# Patient Record
Sex: FEMALE | Race: WHITE | Employment: OTHER | URBAN - METROPOLITAN AREA
[De-identification: names, ages, dates, MRNs, and addresses within clinical notes are randomized per-mention and may not be internally consistent; named-entity substitution may affect disease eponyms.]

---

## 2019-10-03 ENCOUNTER — THERAPY VISIT (OUTPATIENT)
Dept: PHYSICAL THERAPY | Facility: CLINIC | Age: 74
End: 2019-10-03
Payer: COMMERCIAL

## 2019-10-03 DIAGNOSIS — M79.671 PAIN OF RIGHT HEEL: ICD-10-CM

## 2019-10-03 PROCEDURE — 97162 PT EVAL MOD COMPLEX 30 MIN: CPT | Mod: GP | Performed by: PHYSICAL THERAPIST

## 2019-10-03 PROCEDURE — 97110 THERAPEUTIC EXERCISES: CPT | Mod: GP | Performed by: PHYSICAL THERAPIST

## 2019-10-03 PROCEDURE — 97530 THERAPEUTIC ACTIVITIES: CPT | Mod: GP | Performed by: PHYSICAL THERAPIST

## 2019-10-03 NOTE — PROGRESS NOTES
[facility] Initial Evaluation -- Lower Extremity    Evaluation Date: October 3, 2019  Reva Lind is a 74 year old female with a Rt foot condition.   Referral: DPM  Work mechanical stresses: NA   Employment status: Retired  Leisure mechanical stresses: No formal exercise  Functional disability score: NA  VAS score (0-10): 4/10  Patient goals/expectations:  Walk with less pain.    HISTORY:    Present symptoms: Rt posterior heel  Pain quality (sharp/shooting/stabbing/aching/burning/cramping):  Dull ache    Present since (onset date): 1 year MD referral date 7.10.19   Symptoms (improving/unchaning/worsening):  unchanging.      Symptoms commenced as a result of: No apparent reason   Condition occurred in the following environment: Unknown     Symptoms at onset: Rt heel pain  Paresthesia (yes/no):  Yes. N/T feeling in heel  Spinal history: No    Cough/Sneeze (pos/neg):  Neg    Constant symptoms: None  Intermittent symptoms: As above    Symptoms are worse with the following: Sometimes Sitting, Always Rising, Always First few steps, Always Standing, Always Walking, Always Stairs, Always On the move and Time of day - No effect   Symptoms are better with the following: Other - ibuprofen    Continued use makes the pain (better/worse/no effect): worse    Disturbed night (yes/no): No      Pain at rest (yes/no):  Yes  Site (back/hip/knee/ankle/foot):  Rt heel    Other questions (swelling/clicking/locking/giving way/falling):  No     Previous episodes: No  Previous treatments: None    Specific Questions:  General health (excellent/good/fair/poor):  Good  Pertinent medical history includes: High blood pressure, History of fractures and Osteoarthritis  Medications (nil/NSAIDS/analg/steroids/anticoag/other):  NSAIDS and Other - Anti-depressants and High blood pressure  Medical allergies:  None  Imaging (none/Xray/MRI/other):  None  Recent or major surgery (yes/no):  Rt wrist  Night pain (yes/no):  No  Accidents  (yes/no):  No  Unexplained weight loss (yes/no):  No  Barriers at home: None  Other red flags: None    Sites for physical examination (back/hip/knee/ankle/foot/other): ankle, low back    EXAMINATION    Posture:  Sitting (good/fair/poor): Poor    Correction of Posture (better/worse/no effect/NA): Better  Standing (good/fair/poor):   Other observations:      Neurological: (NA/motor/sensory/reflexes/dural): See strength testing below    Baselines (pain or functional activity): Painful walking, going up/down stairs, sitting    Extremities (Hip / Knee / Ankle / Foot): Ankle    Movement Loss Dmitriy Mod Min Nil Pain   Dorsiflexion   X  Rt - 5 deg lacking from 0/Inc N/T heel  Lt - 0 deg   Plantarflexion    X 60 deg (B)   Inversion        Eversion          Passive Movement (+/- over pressure)/(PDM/ERP):  NT  Resisted Test Response (pain): Rt DF 4/5; Rt PF 4/5  Other Tests: NT    Spine:  Movement loss: Flex WNL; Ext Mod-Dmitriy loss/ERP and N/T Rt heel; SG Mod loss (B)  Effect of repeated movements: Prone lying in ext (forearms on 2 pillows) - NE, NE, Dec heel pain walking, Inc DF and PF strength, Inc DF ROM  Effect of static positioning: NT  Spine testing (not relevant/relevant/secondary problem): Relevant    Baseline Symptoms: NA  Repeated Tests Symptom Response Mechanical Response   Active/Passive movement, resisted test, functional test During -  Produce, Abolish, Increase, Decrease, NE After -  Better, Worse, NB, NW, NE Effect -   ? or ? ROM, strength or key functional test No   Effect   NT due to clearing lumbar spine       Effect of static positioning       NT         Provisional Classification (Extremity/Spine):  Spine - Derangement - Asymmetrical, unilateral, symptoms below knee      Princicple of Management:   Education:  Posture, specificity of exercise and rationale with repeated movements, spine as source of extremity sx's    Equipment provided:  None  Exercise and dosage:  LAWRENCE x 3-5 min, 4-5 x  daily    ASSESSMENT/PLAN:    Patient is a 74 year old female with right side ankle complaints.    Patient has the following significant findings with corresponding treatment plan.                Diagnosis 1:  Rt heel pain with lumbar component    Pain -  self management, education, directional preference exercise and home program  Decreased ROM/flexibility - manual therapy, therapeutic exercise and home program  Decreased joint mobility - manual therapy, therapeutic exercise and home program  Decreased strength - therapeutic exercise, therapeutic activities and home program  Impaired gait - gait training and home program  Impaired muscle performance - neuro re-education and home program  Decreased function - therapeutic activities and home program  Impaired posture - neuro re-education and home program    Therapy Evaluation Codes:   1) History comprised of:   Personal factors that impact the plan of care:      None.    Comorbidity factors that impact the plan of care are:      High blood pressure and Osteoarthritis.     Medications impacting care: Anti-depressant, Anti-inflammatory and High blood pressure.  2) Examination of Body Systems comprised of:   Body structures and functions that impact the plan of care:      Ankle and Lumbar spine.   Activity limitations that impact the plan of care are:      Sitting, Stairs, Standing and Walking.  3) Clinical presentation characteristics are:   Evolving/Changing.  4) Decision-Making    Moderate complexity using standardized patient assessment instrument and/or measureable assessment of functional outcome.  Cumulative Therapy Evaluation is: Moderate complexity.    Previous and current functional limitations:  (See Goal Flow Sheet for this information)    Short term and Long term goals: (See Goal Flow Sheet for this information)     Communication ability:  Patient appears to be able to clearly communicate and understand verbal and written communication and follow directions  correctly.  Treatment Explanation - The following has been discussed with the patient:   RX ordered/plan of care  Anticipated outcomes  Possible risks and side effects  This patient would benefit from PT intervention to resume normal activities.   Rehab potential is good.    Frequency:  1 X week, once daily  Duration:  for 6 weeks  Discharge Plan:  Achieve all LTG.  Independent in home treatment program.  Reach maximal therapeutic benefit.    Please refer to the daily flowsheet for treatment today, total treatment time and time spent performing 1:1 timed codes.

## 2019-10-03 NOTE — LETTER
[facility] ATHLETIC [facility] PHYSICAL THERAPY  [address]  [facility] [address]  [phone]    2019    Re: Reva Lind   :   1945  MRN:  [id number]   REFERRING PHYSICIAN:   Ricardo Bae    [facility]THLETIC [facility] PHYSICAL THERAPY    Date of Initial Evaluation:  10/3/2019  Visits:    1  Reason for Referral:  Pain of right heel    [facility] Initial Evaluation -- Lower Extremity    Evaluation Date: October 3, 2019  Reva Lind is a 74 year old female with a Rt foot condition.   Referral: DPM  Work mechanical stresses: NA   Employment status: Retired  Leisure mechanical stresses: No formal exercise  Functional disability score: NA  VAS score (0-10): 4/10  Patient goals/expectations:  Walk with less pain.    HISTORY:  Present symptoms: Rt posterior heel  Pain quality (sharp/shooting/stabbing/aching/burning/cramping):  Dull ache  Present since (onset date): 1 year MD referral date 7.10.19   Symptoms (improving/unchaning/worsening):  unchanging.    Symptoms commenced as a result of: No apparent reason   Condition occurred in the following environment: Unknown   Symptoms at onset: Rt heel pain    Paresthesia (yes/no):  Yes. N/T feeling in heel  Spinal history: No      Cough/Sneeze (pos/neg):  Neg  Constant symptoms: None  Intermittent symptoms: As above  Symptoms are worse with the following: Sometimes Sitting, Always Rising, Always First few steps, Always Standing, Always Walking, Always Stairs, Always On the move and Time of day - No effect   Symptoms are better with the following: Other - ibuprofen  Continued use makes the pain (better/worse/no effect): worse  Disturbed night (yes/no): No    Pain at rest (yes/no):  Yes   Site (back/hip/knee/ankle/foot):  Rt heel  Other questions (swelling/clicking/locking/giving way/falling):  No  Previous episodes: No  Previous treatments: None  Re: Reva Lind   :   1945    Specific  Questions:  General health (excellent/good/fair/poor):  Good  Pertinent medical history includes: High blood pressure, History of fractures and Osteoarthritis  Medications (nil/NSAIDS/analg/steroids/anticoag/other):  NSAIDS and Other - Anti-depressants and High blood pressure  Medical allergies:  None  Imaging (none/Xray/MRI/other):  None  Recent or major surgery (yes/no):  Rt wrist  Night pain (yes/no):  No  Accidents (yes/no):  No  Unexplained weight loss (yes/no):  No  Barriers at home: None  Other red flags: None    Sites for physical examination (back/hip/knee/ankle/foot/other): ankle, low back    EXAMINATION    Posture:  Sitting (good/fair/poor): Poor    Correction of Posture (better/worse/no effect/NA): Better  Standing (good/fair/poor):   Other observations:    Neurological: (NA/motor/sensory/reflexes/dural): See strength testing below  Baselines (pain or functional activity): Painful walking, going up/down stairs, sitting  Extremities (Hip / Knee / Ankle / Foot): Ankle    Movement Loss Dmitriy Mod Min Nil Pain   Dorsiflexion   X  Rt - 5 deg lacking from 0/Inc N/T heel  Lt - 0 deg   Plantarflexion    X 60 deg (B)   Inversion        Eversion          Passive Movement (+/- over pressure)/(PDM/ERP):  NT  Resisted Test Response (pain): Rt DF 4/5; Rt PF 4/5  Other Tests: NT    Spine:  Movement loss: Flex WNL; Ext Mod-Dmitriy loss/ERP and N/T Rt heel; SG Mod loss (B)  Effect of repeated movements: Prone lying in ext (forearms on 2 pillows) - NE, NE, Dec heel pain walking, Inc DF and PF strength, Inc DF ROM  Effect of static positioning: NT  Spine testing (not relevant/relevant/secondary problem): Relevant        Re: Reva Lind   :   1945    Baseline Symptoms: NA  Repeated Tests Symptom Response Mechanical Response   Active/Passive movement, resisted test, functional test During -  Produce, Abolish, Increase, Decrease, NE After -  Better, Worse, NB, NW, NE Effect -   ? or ? ROM, strength or key functional test No    Effect   NT due to clearing lumbar spine       Effect of static positioning       NT       Provisional Classification (Extremity/Spine):  Spine - Derangement - Asymmetrical, unilateral, symptoms below knee    Princicple of Management:   Education:  Posture, specificity of exercise and rationale with repeated movements, spine as source of extremity sx's    Equipment provided:  None  Exercise and dosage:  LAWRENCE x 3-5 min, 4-5 x daily    ASSESSMENT/PLAN:  Patient is a 74 year old female with right side ankle complaints.    Patient has the following significant findings with corresponding treatment plan.                Diagnosis 1:  Rt heel pain with lumbar component    Pain -  self management, education, directional preference exercise and home program  Decreased ROM/flexibility - manual therapy, therapeutic exercise and home program  Decreased joint mobility - manual therapy, therapeutic exercise and home program  Decreased strength - therapeutic exercise, therapeutic activities and home program  Impaired gait - gait training and home program  Impaired muscle performance - neuro re-education and home program  Decreased function - therapeutic activities and home program  Impaired posture - neuro re-education and home program                            Re: Reva Lind   :   1945    Therapy Evaluation Codes:   1) History comprised of:   Personal factors that impact the plan of care:      None.    Comorbidity factors that impact the plan of care are:      High blood pressure and Osteoarthritis.     Medications impacting care: Anti-depressant, Anti-inflammatory and High blood   pressure.  2) Examination of Body Systems comprised of:   Body structures and functions that impact the plan of care:      Ankle and Lumbar spine.   Activity limitations that impact the plan of care are:      Sitting, Stairs, Standing and Walking.  3) Clinical presentation characteristics  are:   Evolving/Changing.  4) Decision-Making    Moderate complexity using standardized patient assessment instrument   and/or measureable assessment of functional outcome.  Cumulative Therapy Evaluation is: Moderate complexity.    Previous and current functional limitations:  (See Goal Flow Sheet for this information)    Short term and Long term goals: (See Goal Flow Sheet for this information)   Communication ability:  Patient appears to be able to clearly communicate and understand verbal and written communication and follow directions correctly.  Treatment Explanation - The following has been discussed with the patient:   RX ordered/plan of care, Anticipated outcomes, Possible risks and side effects    This patient would benefit from PT intervention to resume normal activities.   Rehab potential is good.  Frequency:  1 X week, once daily  Duration:  for 6 weeks  Discharge Plan:  Achieve all LTG.  Independent in home treatment program.  Reach maximal therapeutic benefit.    Thank you for your referral.    INQUIRIES        Therapist:  NAN CoronaT, Cert. MDT  [facility]  [address] [facility] [address]   [place] [address]  Phone: [phone]  Fax: [phone]

## 2019-10-08 PROBLEM — M79.671 PAIN OF RIGHT HEEL: Status: ACTIVE | Noted: 2019-10-08

## 2019-10-28 ENCOUNTER — THERAPY VISIT (OUTPATIENT)
Dept: PHYSICAL THERAPY | Facility: CLINIC | Age: 74
End: 2019-10-28
Payer: COMMERCIAL

## 2019-10-28 DIAGNOSIS — M79.671 PAIN OF RIGHT HEEL: ICD-10-CM

## 2019-10-28 PROCEDURE — 97530 THERAPEUTIC ACTIVITIES: CPT | Mod: GP | Performed by: PHYSICAL THERAPY ASSISTANT

## 2019-10-28 PROCEDURE — 97110 THERAPEUTIC EXERCISES: CPT | Mod: GP | Performed by: PHYSICAL THERAPY ASSISTANT

## 2020-01-06 PROBLEM — M79.671 PAIN OF RIGHT HEEL: Status: RESOLVED | Noted: 2019-10-08 | Resolved: 2020-01-06

## 2020-01-06 NOTE — PROGRESS NOTES
Discharge Note    Progress reporting period is from initial eval to Oct 28, 2019.     Reva failed to return for next follow up visit and current status is unknown.  Please see information below for last relevant information on current status.  Patient seen for Rxs Used: 2 visits.  SUBJECTIVE  Subjective changes noted by patient:  Subjective: Pt reports heel pain a little better. Has been doing ex about 3x a day.   .  Current pain level is Current Pain level: 3/10.     Previous pain level was  Initial Pain level: 5/10.   Changes in function:  Yes (See Goal flowsheet attached for changes in current functional level)  Adverse reaction to treatment or activity: None    OBJECTIVE  Changes noted in objective findings: Objective: LROM flexion: hands to lower shin, ext: 75% loss , L S% loss, R S% loss. Gait antaligic pre rx.      ASSESSMENT/PLAN  Diagnosis: Rt heel pain with lumbar component   DIAGP:  The encounter diagnosis was Pain of right heel.  Updated problem list and treatment plan:   Pain - HEP  Decreased ROM/flexibility - HEP  Decreased function - HEP  Decreased strength - HEP  Impaired muscle performance - HEP  Decreased joint mobility - HEP  STG/LTGs have been met or progress has been made towards goals:  Yes, please see goal flowsheet for most current information  Assessment of Progress: current status is unknown.  Last current status:     Self Management Plans:  HEP  I have re-evaluated this patient and find that the nature, scope, duration and intensity of the therapy is appropriate for the medical condition of the patient.  Reva continues to require the following intervention to meet STG and LTG's:  HEP.    Recommendations:  Discharge with current home program.  Patient to follow up with MD as needed.    Please refer to the daily flowsheet for treatment today, total treatment time and time spent performing 1:1 timed codes.

## 2021-03-20 ENCOUNTER — HEALTH MAINTENANCE LETTER (OUTPATIENT)
Age: 76
End: 2021-03-20

## 2021-09-04 ENCOUNTER — HEALTH MAINTENANCE LETTER (OUTPATIENT)
Age: 76
End: 2021-09-04

## 2022-04-16 ENCOUNTER — HEALTH MAINTENANCE LETTER (OUTPATIENT)
Age: 77
End: 2022-04-16

## 2022-10-22 ENCOUNTER — HEALTH MAINTENANCE LETTER (OUTPATIENT)
Age: 77
End: 2022-10-22

## 2023-06-01 ENCOUNTER — HEALTH MAINTENANCE LETTER (OUTPATIENT)
Age: 78
End: 2023-06-01